# Patient Record
Sex: FEMALE | Race: WHITE
[De-identification: names, ages, dates, MRNs, and addresses within clinical notes are randomized per-mention and may not be internally consistent; named-entity substitution may affect disease eponyms.]

---

## 2018-02-05 ENCOUNTER — HOSPITAL ENCOUNTER (EMERGENCY)
Dept: HOSPITAL 31 - C.ER | Age: 38
Discharge: HOME | End: 2018-02-05
Payer: SELF-PAY

## 2018-02-05 VITALS
DIASTOLIC BLOOD PRESSURE: 76 MMHG | SYSTOLIC BLOOD PRESSURE: 127 MMHG | HEART RATE: 84 BPM | OXYGEN SATURATION: 99 % | TEMPERATURE: 98.9 F

## 2018-02-05 VITALS — RESPIRATION RATE: 18 BRPM

## 2018-02-05 DIAGNOSIS — R10.9: Primary | ICD-10-CM

## 2018-02-05 LAB
ALBUMIN SERPL-MCNC: 4.2 G/DL (ref 3.5–5)
ALBUMIN/GLOB SERPL: 1.3 {RATIO} (ref 1–2.1)
ALT SERPL-CCNC: 34 U/L (ref 9–52)
AST SERPL-CCNC: 27 U/L (ref 14–36)
BACTERIA #/AREA URNS HPF: (no result) /[HPF]
BASOPHILS # BLD AUTO: 0 K/UL (ref 0–0.2)
BASOPHILS NFR BLD: 0.4 % (ref 0–2)
BILIRUB UR-MCNC: NEGATIVE MG/DL
BUN SERPL-MCNC: 10 MG/DL (ref 7–17)
CALCIUM SERPL-MCNC: 9.1 MG/DL (ref 8.6–10.4)
COLOR UR: (no result)
EOSINOPHIL # BLD AUTO: 0.2 K/UL (ref 0–0.7)
EOSINOPHIL NFR BLD: 2.1 % (ref 0–4)
ERYTHROCYTE [DISTWIDTH] IN BLOOD BY AUTOMATED COUNT: 13.7 % (ref 11.5–14.5)
GFR NON-AFRICAN AMERICAN: > 60
GLUCOSE UR STRIP-MCNC: NORMAL MG/DL
HCG,QUALITATIVE URINE: NEGATIVE
HGB BLD-MCNC: 13.5 G/DL (ref 11–16)
LEUKOCYTE ESTERASE UR-ACNC: (no result) LEU/UL
LIPASE: 91 U/L (ref 23–300)
LYMPHOCYTES # BLD AUTO: 3.1 K/UL (ref 1–4.3)
LYMPHOCYTES NFR BLD AUTO: 28.8 % (ref 20–40)
MCH RBC QN AUTO: 30 PG (ref 27–31)
MCHC RBC AUTO-ENTMCNC: 34.6 G/DL (ref 33–37)
MCV RBC AUTO: 86.6 FL (ref 81–99)
MONOCYTES # BLD: 0.5 K/UL (ref 0–0.8)
MONOCYTES NFR BLD: 5 % (ref 0–10)
NEUTROPHILS # BLD: 6.8 K/UL (ref 1.8–7)
NEUTROPHILS NFR BLD AUTO: 63.7 % (ref 50–75)
NRBC BLD AUTO-RTO: 0.1 % (ref 0–2)
PH UR STRIP: 5 [PH] (ref 5–8)
PLATELET # BLD: 342 K/UL (ref 130–400)
PMV BLD AUTO: 8.4 FL (ref 7.2–11.7)
PROT UR STRIP-MCNC: NEGATIVE MG/DL
RBC # BLD AUTO: 4.52 MIL/UL (ref 3.8–5.2)
RBC # UR STRIP: (no result) /UL
SP GR UR STRIP: 1 (ref 1–1.03)
SQUAMOUS EPITHIAL: 25 /HPF (ref 0–5)
URINE NITRATE: NEGATIVE
UROBILINOGEN UR-MCNC: NORMAL MG/DL (ref 0.2–1)
WBC # BLD AUTO: 10.7 K/UL (ref 4.8–10.8)

## 2018-02-05 PROCEDURE — 83690 ASSAY OF LIPASE: CPT

## 2018-02-05 PROCEDURE — 99284 EMERGENCY DEPT VISIT MOD MDM: CPT

## 2018-02-05 PROCEDURE — 85025 COMPLETE CBC W/AUTO DIFF WBC: CPT

## 2018-02-05 PROCEDURE — 81001 URINALYSIS AUTO W/SCOPE: CPT

## 2018-02-05 PROCEDURE — 84703 CHORIONIC GONADOTROPIN ASSAY: CPT

## 2018-02-05 PROCEDURE — 96374 THER/PROPH/DIAG INJ IV PUSH: CPT

## 2018-02-05 PROCEDURE — 96361 HYDRATE IV INFUSION ADD-ON: CPT

## 2018-02-05 PROCEDURE — 80053 COMPREHEN METABOLIC PANEL: CPT

## 2018-02-05 PROCEDURE — 96375 TX/PRO/DX INJ NEW DRUG ADDON: CPT

## 2018-02-05 NOTE — C.PDOC
Time Seen by Provider: 02/05/18 20:47


Chief Complaint (Nursing): Abdominal Pain


History Per: Patient


Onset/Duration Of Symptoms: Days (2), Waxing/Waning


Current Symptoms Are (Timing): Still Present


Severity: Moderate


Location Of Pain/Discomfort: Epigastric, LUQ


Quality Of Discomfort: Unable To Describe


Exacerbating Factors: Food


Additional History Per: Prior Records





Past Medical History


Reviewed: Historical Data, Nursing Documentation, Vital Signs


Vital Signs: 





 Last Vital Signs











Temp  98.9 F   02/05/18 21:41


 


Pulse  84   02/05/18 21:41


 


Resp  18   02/05/18 21:41


 


BP  127/76   02/05/18 21:41


 


Pulse Ox  99   02/05/18 21:41














- Medical History


PMH: Back Problems, Gastritis


Surgical History: No Surg Hx


Family History: States: Unknown Family Hx





- Social History


Hx Tobacco Use: No


Hx Alcohol Use: Yes


Hx Substance Use: No





- Immunization History


Hx Tetanus Toxoid Vaccination: No


Hx Influenza Vaccination: No


Hx Pneumococcal Vaccination: No





Review Of Systems


Except As Marked, All Systems Reviewed And Found Negative.


Constitutional: Negative for: Fever, Weakness


Cardiovascular: Negative for: Chest Pain


Respiratory: Negative for: Shortness of Breath, Hemoptysis


Gastrointestinal: Negative for: Vomiting, Melena, Hematochezia, Hematemesis


Genitourinary: Negative for: Dysuria


Musculoskeletal: Negative for: Neck Pain, Back Pain


Skin: Negative for: Rash


Neurological: Negative for: Weakness, Numbness





Physical Exam





- Physical Exam


Appears: Non-toxic, No Acute Distress


Skin: Normal Color, Warm, Dry, No Rash


Head: Atraumatic, Normacephalic


Eye(s): bilateral: Normal Inspection, PERRL, EOMI


Neck: Normal ROM, Supple


Cardiovascular: Rhythm Regular


Respiratory: Normal Breath Sounds, No Accessory Muscle Use


Gastrointestinal/Abdominal: Soft, Tenderness (mild epigastric), No Guarding, No 

Rebound


Back: No CVA Tenderness


Extremity: Normal ROM


Neurological/Psych: Oriented x3, Normal Motor, Normal Sensation





ED Course And Treatment





- Laboratory Results


Result Diagrams: 


 02/05/18 21:14





 02/05/18 21:14


Urine Pregnancy POC: Negative


O2 Sat by Pulse Oximetry: 99


Pulse Ox Interpretation: Normal





Progress





- Interventions


Interventions:: Observation, Intravenous fluid





- Medications Administered


Intravenous: Antiemetic, H-2 blocker





- Data Reviewed


Data Reviewed: Lab, Old records





- Patient Status


Patient status: Completely improved





- Continuity of Care


Discussed patient case with:: Patient, Family-HIPPA compliant, ED Nurse





- Patient Plan


Patient Plan: Discharge, F/U with PCP





Disposition


Counseled Patient/Family Regarding: Studies Performed, Diagnosis, Need For 

Followup, Rx Given





- Disposition


Referrals: 


[facility] [Outside]


Edwin Casanova MD [Staff Provider] - 


Disposition: HOME/ ROUTINE


Disposition Time: 22:10


Condition: IMPROVED


Additional Instructions: 


Follow up with your doctor or in the clinic within 1-2 weeks for further 

evaluation and treatment. Return to the ER if you develop fever, vomiting, 

bloody or black stools, worsening of symptoms or if you have any other 

concerns. 


Prescriptions: 


Pantoprazole Sodium [Protonix] 40 mg PO DAILY #14 ect


Instructions:  Gastritis (ED)


Print Language: [place]ruguayan





- Clinical Impression


Clinical Impression: 


 Upper abdominal pain

## 2018-05-30 ENCOUNTER — HOSPITAL ENCOUNTER (OUTPATIENT)
Dept: HOSPITAL 31 - C.ENDO | Age: 38
Discharge: HOME | End: 2018-05-30
Attending: INTERNAL MEDICINE
Payer: COMMERCIAL

## 2018-05-30 VITALS — HEART RATE: 68 BPM | RESPIRATION RATE: 18 BRPM | SYSTOLIC BLOOD PRESSURE: 121 MMHG | DIASTOLIC BLOOD PRESSURE: 77 MMHG

## 2018-05-30 VITALS — OXYGEN SATURATION: 100 %

## 2018-05-30 VITALS — TEMPERATURE: 96.9 F

## 2018-05-30 DIAGNOSIS — K21.0: Primary | ICD-10-CM

## 2018-05-30 DIAGNOSIS — K29.70: ICD-10-CM

## 2018-05-30 DIAGNOSIS — E66.9: ICD-10-CM

## 2018-05-30 DIAGNOSIS — K44.9: ICD-10-CM

## 2018-05-30 PROCEDURE — 88305 TISSUE EXAM BY PATHOLOGIST: CPT

## 2018-05-30 PROCEDURE — 43239 EGD BIOPSY SINGLE/MULTIPLE: CPT

## 2018-05-30 PROCEDURE — 84703 CHORIONIC GONADOTROPIN ASSAY: CPT

## 2018-05-30 NOTE — CP.SDSHP
Same Day Surgery H & P





- History


Proposed Procedure: egd


Pre-Op Diagnosis: epigastric pain.  heartburn in spite PPI





- Previous Medical/Surgical History


Endocrine/Metabolic: Obesity


Misc: Other (Gerd, gastritis, hiatal hernia)





- Allergies


Allergies: 


Allergies





No Known Allergies Allergy (Verified 02/05/18 18:35)


 











- Physical Exam


Vital Signs: 


 Vital Signs











  05/30/18





  08:21


 


Temperature 97.7 F


 


Pulse Rate 74


 


Respiratory 18





Rate 


 


Blood Pressure 149/93 H


 


O2 Sat by Pulse 100





Oximetry 











Mental Status: Alert & Oriented x3


Neuro: WNL


Heart: WNL


Lungs: WNL


GI: WNL





- Impression


Impression: epigastric pain.  heartburn refractory to therapy


Pt. Evaluated Today:Candidate for Anesthesia & Procedure: Yes





- Date & Time


Date: 05/30/18


Time: 10:12





Short Stay Discharge





- Short Stay Discharge


Admitting Diagnosis/Reason for Visit: EPIGASTRIC PAIN / HEARTBURN / ABDOMINAL 

TENDERNESS


Disposition: HOME/ ROUTINE

## 2018-11-16 ENCOUNTER — HOSPITAL ENCOUNTER (EMERGENCY)
Dept: HOSPITAL 31 - C.ER | Age: 38
Discharge: HOME | End: 2018-11-16
Payer: COMMERCIAL

## 2018-11-16 VITALS
OXYGEN SATURATION: 99 % | RESPIRATION RATE: 20 BRPM | HEART RATE: 94 BPM | TEMPERATURE: 99.1 F | DIASTOLIC BLOOD PRESSURE: 89 MMHG | SYSTOLIC BLOOD PRESSURE: 133 MMHG
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
## 2018-11-16 DIAGNOSIS — S93.421A: Primary | ICD-10-CM

## 2018-11-16 NOTE — RAD
PROCEDURE:  Right ankle radiographs 



Right foot radiographs 



HISTORY:

 injury 



COMPARISON:

None available.



FINDINGS:



BONES:

No acute displaced fracture. 



JOINTS:

No dislocation. 



SOFT TISSUES:

Mild soft tissue swelling. No evidence of radiopaque foreign body. 



OTHER FINDINGS:

None.



IMPRESSION:

Mild soft tissue swelling. No acute displaced fracture, dislocation, 

or significant joint effusion identified.



If symptoms persist or if there is clinical concern, x-ray follow-up 

in 7-10 days should be considered.

## 2018-11-16 NOTE — C.PDOC
History Of Present Illness





36 yo female come in for evaluation of Right ankle pain, mild swelling gradually

developed since yesterday after sustained twisting injury. Pt reports, pain is 

localized over Right ankle, worse with weight bearing. Otherwise, pt denies head

injury, denies deformity, weakness, sensory or vascular deficits to Right leg. 

Ambulate to Ed for evaluation, not in any apparent distress.


Time Seen by Provider: 11/16/18 07:54


Chief Complaint (Nursing): Lower Extremity Problem/Injury


History Per: Patient





Past Medical History


Reviewed: Historical Data, Nursing Documentation, Vital Signs


Vital Signs: 





                                Last Vital Signs











Temp  99.1 F   11/16/18 07:52


 


Pulse  94 H  11/16/18 07:52


 


Resp  20   11/16/18 07:52


 


BP  133/89   11/16/18 07:52


 


Pulse Ox  99   11/16/18 07:52














- Medical History


PMH: Back Problems, Gastritis


   Denies: Chronic Kidney Disease


Surgical History: Endoscopy


Family History: States: Unknown Family Hx





- Social History


Hx Tobacco Use: No


Hx Alcohol Use: Yes


Hx Substance Use: No





- Immunization History


Hx Tetanus Toxoid Vaccination: No


Hx Influenza Vaccination: No


Hx Pneumococcal Vaccination: No





Review Of Systems


Except As Marked, All Systems Reviewed And Found Negative.


Constitutional: Negative for: Fever, Chills


Eyes: Negative for: Vision Change


Musculoskeletal: Positive for: Foot Pain.  Negative for: Neck Pain, Back Pain


Skin: Negative for: Bruising


Neurological: Negative for: Weakness, Numbness, Altered Mental Status, Headache,

Dizziness





Physical Exam





- Physical Exam


Appears: Well, Non-toxic, No Acute Distress


Skin: Normal Color, Warm, No Ecchymosis


Head: Atraumatic, Normacephalic


Eye(s): bilateral: PERRL


Nose: No Deformity


Neck: No Midline Cervical Tenderness, No Paracervical Tenderness, No Step Off 

Deformity, Supple


Chest: Symmetrical, No Deformity, No Tenderness


Back: No Vertebral Tenderness


Extremity: Normal ROM (Right ankle and foot), Tenderness (lateral malleolus of 

Right ankle extend up to distal fibula. No edema,no ecchymoses, no plapable 

deformity.), No Calf Tenderness (B/L), Capillary Refill (less thasn 2sec to 

Right foot), No Deformity, Swelling (mild over lateral malleolus Right ankle)


Neurological/Psych: Oriented x3, Normal Speech, Normal Motor, Normal Sensation, 

Normal Reflexes





ED Course And Treatment


O2 Sat by Pulse Oximetry: 99


Pulse Ox Interpretation: Normal





- Other Rad


  ** Right ankle and foot


X-Ray: Interpreted by Me, Viewed By Me


Interpretation: (-) acute fx or dislocation


Progress Note: On re-eval, pt is afebrile,  hemodynamicaly stable.  Non-toxic.  

Ambulatory in ED.  Right ankle: mild tenderness over lateral malleolus with mild

edema. No deformity. FAROM, no neurovascular deficits.  Imagings review (-) 

acute fx or dislocation of Right ankle/foot.  Ace wrap and air cast applied to 

Right ankle.  Pt advised.  ref. to f/u with Podiatry Clinic on Monday for re-

evaluation.  return if any new chnages.





Disposition


Counseled Patient/Family Regarding: Studies Performed, Diagnosis, Need For 

Followup, Rx Given





- Disposition


Referrals: 


[facility] [Outside]


Disposition: HOME/ ROUTINE


Disposition Time: 08:32


Condition: STABLE


Additional Instructions: 


RICE-rest, ice, compression, elevation


Ankle splint for 1-2 weeks


take Ibuprofen daily


Follow up with Podiatry Clinic on Monday from 12 PM- 3 PM for further evaluation

 and tx as need


return if any worsening or new changes





Prescriptions: 


Ibuprofen [Motrin Tab] 600 mg PO BID #20 tab


Instructions:  Ankle Sprain


Forms:  [employer] (English), Work Excuse


Print Language: Kiswahili





- Clinical Impression


Clinical Impression: 


 Ankle sprain